# Patient Record
Sex: MALE | Race: WHITE | NOT HISPANIC OR LATINO
[De-identification: names, ages, dates, MRNs, and addresses within clinical notes are randomized per-mention and may not be internally consistent; named-entity substitution may affect disease eponyms.]

---

## 2023-01-10 PROBLEM — Z00.00 ENCOUNTER FOR PREVENTIVE HEALTH EXAMINATION: Status: ACTIVE | Noted: 2023-01-10

## 2023-01-11 ENCOUNTER — APPOINTMENT (OUTPATIENT)
Dept: CARDIOLOGY | Facility: CLINIC | Age: 38
End: 2023-01-11
Payer: COMMERCIAL

## 2023-01-11 ENCOUNTER — NON-APPOINTMENT (OUTPATIENT)
Age: 38
End: 2023-01-11

## 2023-01-11 VITALS — DIASTOLIC BLOOD PRESSURE: 70 MMHG | SYSTOLIC BLOOD PRESSURE: 116 MMHG

## 2023-01-11 VITALS
HEIGHT: 69 IN | DIASTOLIC BLOOD PRESSURE: 66 MMHG | OXYGEN SATURATION: 98 % | TEMPERATURE: 96.6 F | SYSTOLIC BLOOD PRESSURE: 110 MMHG | WEIGHT: 189 LBS | HEART RATE: 92 BPM | BODY MASS INDEX: 27.99 KG/M2

## 2023-01-11 DIAGNOSIS — Z86.39 PERSONAL HISTORY OF OTHER ENDOCRINE, NUTRITIONAL AND METABOLIC DISEASE: ICD-10-CM

## 2023-01-11 DIAGNOSIS — Z78.9 OTHER SPECIFIED HEALTH STATUS: ICD-10-CM

## 2023-01-11 DIAGNOSIS — Z80.0 FAMILY HISTORY OF MALIGNANT NEOPLASM OF DIGESTIVE ORGANS: ICD-10-CM

## 2023-01-11 DIAGNOSIS — Z82.49 FAMILY HISTORY OF ISCHEMIC HEART DISEASE AND OTHER DISEASES OF THE CIRCULATORY SYSTEM: ICD-10-CM

## 2023-01-11 DIAGNOSIS — Z80.8 FAMILY HISTORY OF MALIGNANT NEOPLASM OF OTHER ORGANS OR SYSTEMS: ICD-10-CM

## 2023-01-11 PROCEDURE — 93000 ELECTROCARDIOGRAM COMPLETE: CPT

## 2023-01-11 PROCEDURE — 93242 EXT ECG>48HR<7D RECORDING: CPT | Mod: 59

## 2023-01-11 PROCEDURE — 99205 OFFICE O/P NEW HI 60 MIN: CPT | Mod: 25

## 2023-01-11 RX ORDER — ELECTROLYTES/DEXTROSE
SOLUTION, ORAL ORAL
Refills: 0 | Status: ACTIVE | COMMUNITY

## 2023-01-11 RX ORDER — VORTIOXETINE 20 MG/1
20 TABLET, FILM COATED ORAL DAILY
Refills: 0 | Status: ACTIVE | COMMUNITY

## 2023-01-11 RX ORDER — ALPRAZOLAM 0.25 MG/1
0.25 TABLET ORAL
Refills: 0 | Status: ACTIVE | COMMUNITY

## 2023-01-11 RX ORDER — MULTIVITAMIN
TABLET ORAL
Refills: 0 | Status: ACTIVE | COMMUNITY

## 2023-01-11 NOTE — DISCUSSION/SUMMARY
[FreeTextEntry1] : \par # Palpitations: No alarm symptoms, most likely PAC versus PVC isolated.\par -Echo to rule out structural CV normality\par - ETT for ischemia, heart rate response, BP response, arrhythmia, functional status, oxygen\par -3-day Zio patch\par -Serial lab work\par -Obesity with elevated STOP-BANG score ordered home sleep study\par \par # Hypertriglyceridemia: Reports prior triglyceride 600.  No recent lab work.\par -Updated lab work ordered.  Management depending on results.  Reports no significant thyroid or alcohol use as secondary etiology.\par \par \par Follow-up for testing.  ER precaution given to patient.\par

## 2023-01-11 NOTE — HISTORY OF PRESENT ILLNESS
[FreeTextEntry1] : \par 37 year old\par Palpitations\par Hypertriglyceridemia\par \par \par 1/2023 INITIAL VISIT: for 20 years or so has occasional "skipped beat." feels more prominent when fatigued or after caffeine. minimized caffeine intake. no functional status limitation.  He reports prior having triglycerides 600.  No recent lab work.\par \par \par TESTING I REVIEWED TODAY:

## 2023-01-20 ENCOUNTER — APPOINTMENT (OUTPATIENT)
Dept: CARDIOLOGY | Facility: CLINIC | Age: 38
End: 2023-01-20
Payer: COMMERCIAL

## 2023-01-20 DIAGNOSIS — R07.89 OTHER CHEST PAIN: ICD-10-CM

## 2023-01-20 PROCEDURE — 93015 CV STRESS TEST SUPVJ I&R: CPT

## 2023-01-23 ENCOUNTER — APPOINTMENT (OUTPATIENT)
Dept: CARDIOLOGY | Facility: CLINIC | Age: 38
End: 2023-01-23
Payer: COMMERCIAL

## 2023-01-23 PROCEDURE — 93244 EXT ECG>48HR<7D REV&INTERPJ: CPT

## 2023-02-07 ENCOUNTER — OUTPATIENT (OUTPATIENT)
Dept: OUTPATIENT SERVICES | Facility: HOSPITAL | Age: 38
LOS: 1 days | End: 2023-02-07
Payer: COMMERCIAL

## 2023-02-07 DIAGNOSIS — G47.33 OBSTRUCTIVE SLEEP APNEA (ADULT) (PEDIATRIC): ICD-10-CM

## 2023-02-07 PROCEDURE — 95800 SLP STDY UNATTENDED: CPT

## 2023-02-14 ENCOUNTER — APPOINTMENT (OUTPATIENT)
Dept: CARDIOLOGY | Facility: CLINIC | Age: 38
End: 2023-02-14
Payer: COMMERCIAL

## 2023-02-14 PROCEDURE — 93306 TTE W/DOPPLER COMPLETE: CPT

## 2023-02-21 ENCOUNTER — NON-APPOINTMENT (OUTPATIENT)
Age: 38
End: 2023-02-21

## 2023-02-22 ENCOUNTER — APPOINTMENT (OUTPATIENT)
Dept: CARDIOLOGY | Facility: CLINIC | Age: 38
End: 2023-02-22
Payer: COMMERCIAL

## 2023-02-22 PROCEDURE — 93351 STRESS TTE COMPLETE: CPT

## 2023-02-22 PROCEDURE — 93320 DOPPLER ECHO COMPLETE: CPT

## 2023-02-28 ENCOUNTER — APPOINTMENT (OUTPATIENT)
Dept: PULMONOLOGY | Facility: CLINIC | Age: 38
End: 2023-02-28

## 2023-03-10 ENCOUNTER — APPOINTMENT (OUTPATIENT)
Dept: CARDIOLOGY | Facility: CLINIC | Age: 38
End: 2023-03-10
Payer: COMMERCIAL

## 2023-03-10 VITALS
TEMPERATURE: 97.1 F | HEART RATE: 88 BPM | WEIGHT: 187 LBS | DIASTOLIC BLOOD PRESSURE: 74 MMHG | HEIGHT: 69 IN | BODY MASS INDEX: 27.7 KG/M2 | OXYGEN SATURATION: 97 % | SYSTOLIC BLOOD PRESSURE: 114 MMHG

## 2023-03-10 DIAGNOSIS — R94.39 ABNORMAL RESULT OF OTHER CARDIOVASCULAR FUNCTION STUDY: ICD-10-CM

## 2023-03-10 PROCEDURE — 99215 OFFICE O/P EST HI 40 MIN: CPT

## 2023-03-10 NOTE — DISCUSSION/SUMMARY
[FreeTextEntry1] : \par # Severe LORENE: rec pulm sleep md.  diet/weight loss/lifestyle optimization. Mediterranean diet.\par \par # Hypertriglyceridemia: lorene could be contributory otherwise no significant secondary etiology noted (thyroid or alcohol)\par - atorva 40, labwork and consider increase or fenofibrate\par \par # Mild transaminitis likely hepatic steatosis: serial ordered \par \par # Subclinical hypothyroidism: recheck ordered \par \par # Palpitations: Severe sleep apnea likely contributory. no structural CV abnormality.  No ischemia.  Normal electrolytes.  No hyperthyroidism.  Zio patch unremarkable 3-day..\par \par \par Follow-up in 6 months. call with lab results. ER precaution given to patient.\par

## 2023-03-10 NOTE — CARDIOLOGY SUMMARY
[de-identified] : NSR, fusion beat  [de-identified] : 1/2023: 3-day Zio patch rare ectopy.  Symptoms correlated without arrhythmias.  Average 91. [de-identified] : 2/2023: Treadmill stress echo with 10.3 METS.  Nonspecific EKG changes with normal echocardiographic portion on treadmill.  Normal blood pressure and heart rate responses.  No ischemia. [de-identified] : 2/2023: Preserved biventricular function.  Normal diastolic function.  Trace to mild valvular disease. [de-identified] : \par 1/2023 labwork: Triglycerides 552.  Increased lipoprotein a.  Normal A1c.  Hepatic steatosis likely.  Subclinical hypothyroidism.\par 2/2023: Sleep study showed severe sleep apnea.

## 2023-03-10 NOTE — HISTORY OF PRESENT ILLNESS
[FreeTextEntry1] : \par 37 year old\par Severe LORENE\par Hypertriglyceridemia\par Mild transaminitis likely hepatic steatosis\par Subclinical hypothyroidism\par \par \par 3/2023 VISIT: testing as detailed. started atorvastatin given triglycerides 552.  No ischemia.  Lab work as detailed below.  Severe sleep apnea. +fatigue. no rhythm disturbance. \par \par 1/2023 INITIAL VISIT: for 20 years or so has occasional "skipped beat." feels more prominent when fatigued or after caffeine. minimized caffeine intake. no functional status limitation.  He reports prior having triglycerides 600.  No recent lab work.\par \par \par TESTING I REVIEWED TODAY:

## 2023-04-20 ENCOUNTER — APPOINTMENT (OUTPATIENT)
Dept: PULMONOLOGY | Facility: CLINIC | Age: 38
End: 2023-04-20
Payer: COMMERCIAL

## 2023-04-20 VITALS
BODY MASS INDEX: 27.4 KG/M2 | DIASTOLIC BLOOD PRESSURE: 70 MMHG | WEIGHT: 185 LBS | SYSTOLIC BLOOD PRESSURE: 130 MMHG | HEIGHT: 69 IN

## 2023-04-20 VITALS — HEART RATE: 77 BPM | OXYGEN SATURATION: 97 % | RESPIRATION RATE: 16 BRPM

## 2023-04-20 DIAGNOSIS — Z72.0 TOBACCO USE: ICD-10-CM

## 2023-04-20 PROCEDURE — 99204 OFFICE O/P NEW MOD 45 MIN: CPT

## 2023-04-20 NOTE — HISTORY OF PRESENT ILLNESS
[Obstructive Sleep Apnea] : obstructive sleep apnea [Awakes Unrefreshed] : awakes unrefreshed [Awakes with Dry Mouth] : awakes with dry mouth [Awakes with Headache] : awakes with headache [Daytime Somnolence] : daytime somnolence [Snoring] : snoring [Witnessed Apneas] : witnessed apneas [Home] : home [TextBox_81] : 30 [TextBox_85] : 10 [TextBox_89] : 0 [TextBox_100] : 2/7/2023 [TextBox_108] : 52.4 [TextBox_112] : 4.6 minutes [TextBox_116] : 86 [ESS] : 17

## 2023-04-20 NOTE — DISCUSSION/SUMMARY
[Obstructive Sleep Apnea] : obstructive sleep apnea [Severe] : severe [Alcohol Avoidance] : alcohol avoidance [None] : There are no changes in medication management [Sedative Avoidance] : sedative avoidance [Weight Loss Program] : weight loss program [CPAP] : CPAP [de-identified] : The pathophysiology of sleep was explained to the patient in detail. Inclusive of this was the reasoning behind and the expected response to positive airway pressure therapy. Compliance was outlined including further followup  Patient compliant with CPAP/BiPAP and benefiting from therapy.  Resmed Airsense  autoset  in a range 4-16 with nasal mask

## 2023-04-20 NOTE — END OF VISIT
[FreeTextEntry3] : CPAP initiation [Time Spent: ___ minutes] : I have spent [unfilled] minutes of time on the encounter.

## 2023-04-20 NOTE — CONSULT LETTER
[Dear  ___] : Dear  [unfilled], [Consult Letter:] : I had the pleasure of evaluating your patient, [unfilled]. [Consult Closing:] : Thank you very much for allowing me to participate in the care of this patient.  If you have any questions, please do not hesitate to contact me. [Sincerely,] : Sincerely, [FreeTextEntry3] : Silver Rod MD FCCP\par Pulmonary/Critical Care/Sleep Medicine\par Department of Internal Medicine\par \par [facility]\par

## 2023-07-18 ENCOUNTER — APPOINTMENT (OUTPATIENT)
Dept: PULMONOLOGY | Facility: CLINIC | Age: 38
End: 2023-07-18
Payer: COMMERCIAL

## 2023-07-18 VITALS
DIASTOLIC BLOOD PRESSURE: 70 MMHG | OXYGEN SATURATION: 96 % | RESPIRATION RATE: 16 BRPM | BODY MASS INDEX: 26.66 KG/M2 | WEIGHT: 180 LBS | HEART RATE: 86 BPM | HEIGHT: 69 IN | SYSTOLIC BLOOD PRESSURE: 110 MMHG

## 2023-07-18 PROCEDURE — 99215 OFFICE O/P EST HI 40 MIN: CPT

## 2023-07-18 NOTE — DISCUSSION/SUMMARY
[Obstructive Sleep Apnea] : obstructive sleep apnea [Severe] : severe [None] : There are no changes in medication management [Alcohol Avoidance] : alcohol avoidance [Sedative Avoidance] : sedative avoidance [Weight Loss Program] : weight loss program [CPAP] : CPAP [FreeTextEntry1] : Sample Dreamware FFM medium with small bridge was fitted by my to the patient.\par He may wish to change masks

## 2023-07-18 NOTE — CONSULT LETTER
[Dear  ___] : Dear  [unfilled], [Consult Letter:] : I had the pleasure of evaluating your patient, [unfilled]. [Consult Closing:] : Thank you very much for allowing me to participate in the care of this patient.  If you have any questions, please do not hesitate to contact me. [Sincerely,] : Sincerely, [FreeTextEntry3] : Silver Rod MD FCCP\par Pulmonary/Critical Care/Sleep Medicine\par Department of Internal Medicine\par \par [facility]\par

## 2023-07-18 NOTE — HISTORY OF PRESENT ILLNESS
[Obstructive Sleep Apnea] : obstructive sleep apnea [Home] : home [Nasal mask] : nasal mask [APAP:] : APAP [Awakes Unrefreshed] : does not awaken unrefreshed [Awakes with Dry Mouth] : does not awaken with dry mouth [Awakes with Headache] : does not awaken with headache [Daytime Somnolence] : denies daytime somnolence [Snoring] : no snoring [Witnessed Apneas] : no witnessed apneas [TextBox_81] : 30 [TextBox_85] : 10 [TextBox_89] : 0 [TextBox_100] : 2/7/2023 [TextBox_108] : 52.4 [TextBox_112] : 4.6 minutes [TextBox_116] : 86 [TextBox_125] : 4-16 [TextBox_127] : 6/14/2023 [TextBox_129] : 07/13/2023 [TextBox_133] : 100 [TextBox_137] : 77 [TextBox_141] : 5 [TextBox_143] : 44 [TextBox_147] : 2.9 [TextBox_158] : Tenzin [TextBox_160] : N20 [TextBox_162] : 4/20/2023 [ESS] : 10

## 2023-09-22 ENCOUNTER — APPOINTMENT (OUTPATIENT)
Dept: CARDIOLOGY | Facility: CLINIC | Age: 38
End: 2023-09-22

## 2023-10-11 ENCOUNTER — APPOINTMENT (OUTPATIENT)
Dept: CARDIOLOGY | Facility: CLINIC | Age: 38
End: 2023-10-11
Payer: COMMERCIAL

## 2023-10-11 ENCOUNTER — NON-APPOINTMENT (OUTPATIENT)
Age: 38
End: 2023-10-11

## 2023-10-11 VITALS
WEIGHT: 197 LBS | SYSTOLIC BLOOD PRESSURE: 110 MMHG | DIASTOLIC BLOOD PRESSURE: 66 MMHG | OXYGEN SATURATION: 97 % | HEIGHT: 69 IN | BODY MASS INDEX: 29.18 KG/M2 | HEART RATE: 79 BPM

## 2023-10-11 DIAGNOSIS — Z71.89 OTHER SPECIFIED COUNSELING: ICD-10-CM

## 2023-10-11 PROCEDURE — 93000 ELECTROCARDIOGRAM COMPLETE: CPT

## 2023-10-23 ENCOUNTER — APPOINTMENT (OUTPATIENT)
Dept: CARDIOLOGY | Facility: CLINIC | Age: 38
End: 2023-10-23
Payer: COMMERCIAL

## 2023-10-23 PROCEDURE — 93242 EXT ECG>48HR<7D RECORDING: CPT

## 2023-10-25 ENCOUNTER — NON-APPOINTMENT (OUTPATIENT)
Age: 38
End: 2023-10-25

## 2023-10-31 ENCOUNTER — NON-APPOINTMENT (OUTPATIENT)
Age: 38
End: 2023-10-31

## 2023-11-14 ENCOUNTER — NON-APPOINTMENT (OUTPATIENT)
Age: 38
End: 2023-11-14

## 2023-11-15 ENCOUNTER — APPOINTMENT (OUTPATIENT)
Dept: CARDIOLOGY | Facility: CLINIC | Age: 38
End: 2023-11-15
Payer: COMMERCIAL

## 2023-11-15 VITALS
DIASTOLIC BLOOD PRESSURE: 60 MMHG | BODY MASS INDEX: 29.62 KG/M2 | SYSTOLIC BLOOD PRESSURE: 108 MMHG | OXYGEN SATURATION: 98 % | WEIGHT: 200 LBS | HEART RATE: 87 BPM | HEIGHT: 69 IN

## 2023-11-15 DIAGNOSIS — R00.2 PALPITATIONS: ICD-10-CM

## 2023-11-15 PROCEDURE — 99215 OFFICE O/P EST HI 40 MIN: CPT

## 2023-11-15 RX ORDER — ATORVASTATIN CALCIUM 40 MG/1
40 TABLET, FILM COATED ORAL
Qty: 90 | Refills: 3 | Status: DISCONTINUED | COMMUNITY
Start: 2023-01-23 | End: 2023-11-15

## 2023-11-24 ENCOUNTER — APPOINTMENT (OUTPATIENT)
Dept: CARDIOLOGY | Facility: CLINIC | Age: 38
End: 2023-11-24
Payer: COMMERCIAL

## 2023-11-24 VITALS
BODY MASS INDEX: 30.51 KG/M2 | HEART RATE: 79 BPM | SYSTOLIC BLOOD PRESSURE: 112 MMHG | DIASTOLIC BLOOD PRESSURE: 62 MMHG | OXYGEN SATURATION: 96 % | WEIGHT: 206 LBS | HEIGHT: 69 IN

## 2023-11-24 PROCEDURE — 99213 OFFICE O/P EST LOW 20 MIN: CPT | Mod: 25

## 2023-11-24 PROCEDURE — 93000 ELECTROCARDIOGRAM COMPLETE: CPT

## 2023-11-29 ENCOUNTER — APPOINTMENT (OUTPATIENT)
Dept: ELECTROPHYSIOLOGY | Facility: CLINIC | Age: 38
End: 2023-11-29
Payer: COMMERCIAL

## 2023-11-29 VITALS
DIASTOLIC BLOOD PRESSURE: 62 MMHG | BODY MASS INDEX: 29.18 KG/M2 | HEIGHT: 69 IN | HEART RATE: 77 BPM | OXYGEN SATURATION: 95 % | WEIGHT: 197 LBS | SYSTOLIC BLOOD PRESSURE: 110 MMHG

## 2023-11-29 PROCEDURE — 93000 ELECTROCARDIOGRAM COMPLETE: CPT

## 2023-11-29 PROCEDURE — 99204 OFFICE O/P NEW MOD 45 MIN: CPT | Mod: 25

## 2023-11-30 ENCOUNTER — NON-APPOINTMENT (OUTPATIENT)
Age: 38
End: 2023-11-30

## 2024-01-17 RX ORDER — ASPIRIN 81 MG/1
81 TABLET ORAL DAILY
Qty: 90 | Refills: 3 | Status: ACTIVE | COMMUNITY
Start: 2024-01-17 | End: 1900-01-01

## 2024-01-17 RX ORDER — APIXABAN 5 MG/1
5 TABLET, FILM COATED ORAL
Qty: 180 | Refills: 1 | Status: DISCONTINUED | COMMUNITY
Start: 2023-10-24 | End: 2024-01-17

## 2024-02-13 PROBLEM — R74.01 TRANSAMINITIS: Status: ACTIVE | Noted: 2023-03-10

## 2024-02-13 PROBLEM — Z99.89 CPAP (CONTINUOUS POSITIVE AIRWAY PRESSURE) DEPENDENCE: Status: ACTIVE | Noted: 2023-07-18

## 2024-02-13 PROBLEM — G47.33 SEVERE OBSTRUCTIVE SLEEP APNEA: Status: ACTIVE | Noted: 2023-03-10

## 2024-02-13 PROBLEM — E03.8 SUBCLINICAL HYPOTHYROIDISM: Status: ACTIVE | Noted: 2023-03-10

## 2024-02-13 PROBLEM — E78.41 ELEVATED LIPOPROTEIN A LEVEL: Status: ACTIVE | Noted: 2023-01-20

## 2024-02-13 PROBLEM — E78.1 HYPERTRIGLYCERIDEMIA: Status: ACTIVE | Noted: 2023-01-11

## 2024-02-14 ENCOUNTER — APPOINTMENT (OUTPATIENT)
Dept: CARDIOLOGY | Facility: CLINIC | Age: 39
End: 2024-02-14
Payer: COMMERCIAL

## 2024-02-14 VITALS
WEIGHT: 200 LBS | BODY MASS INDEX: 29.62 KG/M2 | SYSTOLIC BLOOD PRESSURE: 114 MMHG | HEART RATE: 85 BPM | OXYGEN SATURATION: 94 % | HEIGHT: 69 IN | DIASTOLIC BLOOD PRESSURE: 66 MMHG

## 2024-02-14 DIAGNOSIS — E78.1 PURE HYPERGLYCERIDEMIA: ICD-10-CM

## 2024-02-14 DIAGNOSIS — E78.41 ELEVATED LIPOPROTEIN(A): ICD-10-CM

## 2024-02-14 DIAGNOSIS — G47.33 OBSTRUCTIVE SLEEP APNEA (ADULT) (PEDIATRIC): ICD-10-CM

## 2024-02-14 DIAGNOSIS — E03.8 OTHER SPECIFIED HYPOTHYROIDISM: ICD-10-CM

## 2024-02-14 DIAGNOSIS — Z99.89 DEPENDENCE ON OTHER ENABLING MACHINES AND DEVICES: ICD-10-CM

## 2024-02-14 DIAGNOSIS — R74.01 ELEVATION OF LEVELS OF LIVER TRANSAMINASE LEVELS: ICD-10-CM

## 2024-02-14 LAB — HBA1C MFR BLD HPLC: 5.8

## 2024-02-14 PROCEDURE — 99214 OFFICE O/P EST MOD 30 MIN: CPT

## 2024-02-14 NOTE — ADDENDUM
[FreeTextEntry1] : Please note the patient was reviewed with the PA. I was physically present during the service of the patient I was directly involved in the management plan and recommendations of care provided to the patient.  I personally reviewed the history and physical exam and plan as documented by the PA above.  Jose Alfredo Jean DO, FAC, [facility] Cardiologist 11/24/2023

## 2024-02-14 NOTE — CARDIOLOGY SUMMARY
[de-identified] : NSR, fusion beat  [de-identified] : 10/2023 afibb noted 1/2023: 3-day Zio patch rare ectopy.  Symptoms correlated without arrhythmias.  Average 91. [de-identified] : 2/2023: Treadmill stress echo with 10.3 METS.  Nonspecific EKG changes with normal echocardiographic portion on treadmill.  Normal blood pressure and heart rate responses.  No ischemia. [de-identified] : 2/2023: Preserved biventricular function.  Normal diastolic function.  Trace to mild valvular disease. [de-identified] : \par  1/2023 labwork: Triglycerides 552.  Increased lipoprotein a.  Normal A1c.  Hepatic steatosis likely.  Subclinical hypothyroidism.\par  2/2023: Sleep study showed severe sleep apnea.

## 2024-02-14 NOTE — DISCUSSION/SUMMARY
[FreeTextEntry1] : # Paroxysmal atrial fibrillation: CHADSVASC 0. Severe sleep apnea likely contributory.  Utilizes CPAP on regular basis.  No structural CV abnormality.  No ischemia.  Normal electrolytes.  No hyperthyroidism. - sees EP, maintain aspirin 81 therapy. will consider AFibb burden to decide on any further therapy.   # Severe LORENE: continue pulm/sleep and CPAP f/u. diet/weight loss/lifestyle optimization. Mediterranean diet.  # Hypertriglyceridemia: lorene could be contributory otherwise no significant secondary etiology noted (thyroid or alcohol) - needs recheck after he has been on rosuvastatin 40 and fenofibrate now (prior was confused).   # Mild transaminitis likely hepatic steatosis: stable.   Follow-up with EP as scheduled. See me in 5 months with labwork prior. ER precautions given to patient.

## 2024-02-14 NOTE — REASON FOR VISIT
[Symptom and Test Evaluation] : symptom and test evaluation [CV Risk Factors and Non-Cardiac Disease] : CV risk factors and non-cardiac disease [FreeTextEntry3] : Dr. Mo Summers

## 2024-02-23 ENCOUNTER — APPOINTMENT (OUTPATIENT)
Dept: ELECTROPHYSIOLOGY | Facility: CLINIC | Age: 39
End: 2024-02-23
Payer: COMMERCIAL

## 2024-02-23 VITALS
OXYGEN SATURATION: 98 % | DIASTOLIC BLOOD PRESSURE: 60 MMHG | WEIGHT: 200 LBS | HEART RATE: 73 BPM | BODY MASS INDEX: 29.62 KG/M2 | HEIGHT: 69 IN | SYSTOLIC BLOOD PRESSURE: 98 MMHG

## 2024-02-23 DIAGNOSIS — I48.91 UNSPECIFIED ATRIAL FIBRILLATION: ICD-10-CM

## 2024-02-23 PROCEDURE — 93000 ELECTROCARDIOGRAM COMPLETE: CPT

## 2024-02-23 PROCEDURE — 99214 OFFICE O/P EST MOD 30 MIN: CPT | Mod: 25

## 2024-02-23 RX ORDER — FLECAINIDE ACETATE 50 MG/1
50 TABLET ORAL
Qty: 60 | Refills: 1 | Status: ACTIVE | COMMUNITY
Start: 2024-02-23 | End: 1900-01-01

## 2024-03-01 PROBLEM — I48.91 ATRIAL FIBRILLATION, UNSPECIFIED TYPE: Status: ACTIVE | Noted: 2023-10-24

## 2024-03-01 NOTE — DISCUSSION/SUMMARY
[EKG obtained to assist in diagnosis and management of assessed problem(s)] : EKG obtained to assist in diagnosis and management of assessed problem(s) [FreeTextEntry1] : I have discussed the options with the patient of medication versus catheter ablation procedure.  He would prefer to try medication first.  He had a previous stress echo performed February 2023.  This was normal.    The options for medications patient would be a type Ic drug versus sotalol. He has no family history of coronary disease.  Will try him on an antiarrhythmic.  He is already on metoprolol succinate ER 25 mg twice daily.  Will start him on for flecainide 50 mg twice daily.  Will get monitoring as well. Side effect profile discussed.  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  He was noted on his monitor from October 2023.  Patient seems to be aware of his A-fib episodes with palpitations and sometimes shortness of breath.  There are no clear triggers although 1 episode was triggered by excessive coughing.  He does have a history of some severe sleep apnea.  There is a questionable family history of A-fib but is not having A-fib His alcohol consumption is insignificant.  His caffeine intake is also minimal.  Patient has a prior history of ulcerative anxiety and depression for which she takes Xanax as needed also takes Trintellix.  I discussed the pathophysiology of his atrial fibrillation with treatment plans, lifestyle modifications should be.  The patient and his spouse seems to understand.  Our plan will be to aggressively take care of his sleep apnea weight loss and stress reduction.  Will continue on the current dose of metoprolol.  He is on Eliquis 5 mg twice daily and because of his low [id number] score he would not need to continue this.  I would reassess the patient over 3 to 4 months.  To see what his AF burden is and then make further decisions whether we would use an antiarrhythmic or proceed to catheter ablation procedure.   d/c eliquis can take ASA 81 mg f/u 4 months to reassess

## 2024-03-01 NOTE — REVIEW OF SYSTEMS
[Palpitations] : palpitations [Under Stress] : under stress [Fever] : no fever [Feeling Fatigued] : not feeling fatigued [Sore Throat] : no sore throat [Dyspnea on exertion] : not dyspnea during exertion [SOB] : no shortness of breath [Abdominal Pain] : no abdominal pain [Cough] : no cough [Easy Bleeding] : no tendency for easy bleeding [Dizziness] : no dizziness

## 2024-03-01 NOTE — HISTORY OF PRESENT ILLNESS
[FreeTextEntry1] : He was seen in follow-up evaluation regarding his atrial fibrillation.  Apparently he is getting more episodes recently.  His main risk factor was the sleep apnea and is compliant with his CPAP.    A-fib frequency: Episodes have been random and variable in duration.  They can last a few minutes to much longer as much hour.    His symptoms seem to increased January of this year which time he seen a cardiologist.  Known to have APCs and PVCs.  Patient was doing well until August 2023 when he had an episode of atrial fibrillation.  He has not had any recurrence of A-fib since then.  He has symptoms of palpitations.  The patient had a Zio patch monitor October 2023 which showed episodes of A-fib with increased ventricular response.  Also showed short episodes of sinus tachycardia and APCs.  The episodes of A-fib noted on the monitor patient thinks he was at work.  He had a stress echocardiogram performed February 22, 2023 patient exercised for 9 minutes standard Wally protocol stopped because of target heart rate achieved peak heart rate was 172 bpm 10.3 METS.  Normal stress echo  Echocardiogram performed February 4, 2023 showed EF 60 to 65%, trace mitral regurg, trace tricuspid regurg, no pericardial effusion.  Normal right and left atrial size.  He had a sleep study performed February 7, 2023 that showed severe sleep apnea with an AHI of 52.4 and khris O2 saturation of 86%.  He currently uses CPAP but not recently because of stuffy nose/sinus issue.  He has no known history of hypertension or diabetes.  The patient has been on Eliquis 5 mg twice daily.  Is also been on metoprolol succinate ER 25 mg 1 tablet twice daily.  This is in addition to his lipid therapy.  Current symptoms: Patient denies shortness of breath., Palpitations.  Occasional atypical sharp chest discomfort.  He does get shortness of breath when he is in A-fib. AF trigger: cough No thyroid problems.  Has GE reflux

## 2024-03-26 RX ORDER — FENOFIBRATE 145 MG/1
145 TABLET, COATED ORAL DAILY
Qty: 90 | Refills: 1 | Status: ACTIVE | COMMUNITY
Start: 2023-11-15 | End: 1900-01-01

## 2024-04-04 ENCOUNTER — RESULT REVIEW (OUTPATIENT)
Age: 39
End: 2024-04-04

## 2024-04-04 ENCOUNTER — APPOINTMENT (OUTPATIENT)
Dept: ULTRASOUND IMAGING | Facility: CLINIC | Age: 39
End: 2024-04-04
Payer: COMMERCIAL

## 2024-04-04 PROCEDURE — 76981 USE PARENCHYMA: CPT

## 2024-05-17 ENCOUNTER — NON-APPOINTMENT (OUTPATIENT)
Age: 39
End: 2024-05-17

## 2024-05-18 ENCOUNTER — RX RENEWAL (OUTPATIENT)
Age: 39
End: 2024-05-18

## 2024-05-18 RX ORDER — ROSUVASTATIN CALCIUM 40 MG/1
40 TABLET, FILM COATED ORAL DAILY
Qty: 90 | Refills: 3 | Status: ACTIVE | COMMUNITY
Start: 2023-11-15 | End: 1900-01-01

## 2024-06-18 ENCOUNTER — NON-APPOINTMENT (OUTPATIENT)
Age: 39
End: 2024-06-18

## 2024-06-19 RX ORDER — METOPROLOL SUCCINATE 25 MG/1
25 TABLET, EXTENDED RELEASE ORAL
Qty: 180 | Refills: 3 | Status: ACTIVE | COMMUNITY
Start: 2023-10-24 | End: 1900-01-01

## 2024-07-24 ENCOUNTER — NON-APPOINTMENT (OUTPATIENT)
Age: 39
End: 2024-07-24

## 2024-07-24 ENCOUNTER — APPOINTMENT (OUTPATIENT)
Dept: CARDIOLOGY | Facility: CLINIC | Age: 39
End: 2024-07-24
Payer: COMMERCIAL

## 2024-07-24 VITALS
BODY MASS INDEX: 28.94 KG/M2 | OXYGEN SATURATION: 96 % | DIASTOLIC BLOOD PRESSURE: 60 MMHG | WEIGHT: 196 LBS | SYSTOLIC BLOOD PRESSURE: 90 MMHG | HEART RATE: 70 BPM

## 2024-07-24 DIAGNOSIS — G47.33 OBSTRUCTIVE SLEEP APNEA (ADULT) (PEDIATRIC): ICD-10-CM

## 2024-07-24 DIAGNOSIS — E78.1 PURE HYPERGLYCERIDEMIA: ICD-10-CM

## 2024-07-24 DIAGNOSIS — E78.41 ELEVATED LIPOPROTEIN(A): ICD-10-CM

## 2024-07-24 DIAGNOSIS — R74.01 ELEVATION OF LEVELS OF LIVER TRANSAMINASE LEVELS: ICD-10-CM

## 2024-07-24 DIAGNOSIS — I48.91 UNSPECIFIED ATRIAL FIBRILLATION: ICD-10-CM

## 2024-07-24 PROCEDURE — 99214 OFFICE O/P EST MOD 30 MIN: CPT

## 2024-07-24 PROCEDURE — 93000 ELECTROCARDIOGRAM COMPLETE: CPT

## 2024-07-24 RX ORDER — BUPROPION HYDROCHLORIDE 300 MG/1
300 TABLET, EXTENDED RELEASE ORAL DAILY
Refills: 0 | Status: ACTIVE | COMMUNITY

## 2024-07-24 NOTE — ADDENDUM
[FreeTextEntry1] : Please note the patient was reviewed with the PA. I was physically present during the service of the patient I was directly involved in the management plan and recommendations of care provided to the patient.  I personally reviewed the history and physical exam and plan as documented by the PA above.  Jose Alfredo Jean DO, FAC, [facility] Cardiologist 11/24/2023

## 2024-07-24 NOTE — DISCUSSION/SUMMARY
[FreeTextEntry1] : # symptomatic paroxysmal atrial fibrillation: CHADSVASC 0. Severe sleep apnea likely contributory.  Utilizes CPAP on regular basis.  No structural CV abnormality.  No ischemia.  Normal electrolytes.  No hyperthyroidism. - sees EP, maintain aspirin 81 therapy. flecainide/bb, he will discuss with EP regarding doses. tremor ?from flecainide.   # Severe LORENE: continue pulm/sleep and CPAP f/u. diet/weight loss/lifestyle optimization. Mediterranean diet.  # Hypertriglyceridemia: lorene could be contributory otherwise no significant secondary etiology noted (thyroid or alcohol) - needs recheck after he has been on rosuvastatin 40 and fenofibrate.   getting labwork next month with PCP, send to me. Follow-up with EP soon to discuss meds. See me in 6 months. ER precautions given to patient.

## 2024-07-24 NOTE — CARDIOLOGY SUMMARY
[de-identified] : NSR, fusion beat  [de-identified] : 10/2023 afibb noted 1/2023: 3-day Zio patch rare ectopy.  Symptoms correlated without arrhythmias.  Average 91. [de-identified] : 2/2023: Treadmill stress echo with 10.3 METS.  Nonspecific EKG changes with normal echocardiographic portion on treadmill.  Normal blood pressure and heart rate responses.  No ischemia. [de-identified] : 2/2023: Preserved biventricular function.  Normal diastolic function.  Trace to mild valvular disease. [de-identified] : \par  1/2023 labwork: Triglycerides 552.  Increased lipoprotein a.  Normal A1c.  Hepatic steatosis likely.  Subclinical hypothyroidism.\par  2/2023: Sleep study showed severe sleep apnea.

## 2024-07-24 NOTE — HISTORY OF PRESENT ILLNESS
[FreeTextEntry1] : 38 year old Paroxysmal atrial fibrillation  Severe LORENE CPAP  Hypertriglyceridemia Mild transaminitis hepatic steatosis  7/2024 VISIT: feels great no recurrence, change meds as per EP. elastography found hepatic steatosis. benign rhythm monitor. ekg good intervals.   2/2024 VISIT: intermittent symptoms of afibb of fatigue. sinus presently. triglycerides elevated was confused about his lipid-lowering therapy. going to the gym.   11/24/23 VISIT: Went back into AF on Tues.  [place] fatigue and BORRERO.  He has increased his metoprolol to 50mg QD.  Feels well.  Denies abnormal bleeding. EKG in the office today with NSR.   11/2023 VISIT: had afibb rvr on rhythm monitoring. started on OAC. no angina.  labwork still with triglycerides 469. a1c 5.8. normal tsh.   10/2023 VISIT: saw pulm sleep on cpap now. did not repeat bloodwork. no angina. still palpitations but benign.   3/2023 VISIT: testing as detailed. started atorvastatin given triglycerides 552.  No ischemia.  Lab work as detailed below.  Severe sleep apnea. +fatigue. no rhythm disturbance.   1/2023 INITIAL VISIT: for 20 years or so has occasional "skipped beat." feels more prominent when fatigued or after caffeine. minimized caffeine intake. no functional status limitation.  He reports prior having triglycerides 600.  No recent lab work.  **TESTING I REVIEWED TODAY: excluding above

## 2024-08-23 ENCOUNTER — APPOINTMENT (OUTPATIENT)
Dept: ELECTROPHYSIOLOGY | Facility: CLINIC | Age: 39
End: 2024-08-23
Payer: COMMERCIAL

## 2024-08-23 ENCOUNTER — NON-APPOINTMENT (OUTPATIENT)
Age: 39
End: 2024-08-23

## 2024-08-23 VITALS
OXYGEN SATURATION: 100 % | WEIGHT: 200 LBS | SYSTOLIC BLOOD PRESSURE: 100 MMHG | HEIGHT: 69 IN | HEART RATE: 72 BPM | DIASTOLIC BLOOD PRESSURE: 62 MMHG | BODY MASS INDEX: 29.62 KG/M2

## 2024-08-23 DIAGNOSIS — I48.91 UNSPECIFIED ATRIAL FIBRILLATION: ICD-10-CM

## 2024-08-23 DIAGNOSIS — G47.33 OBSTRUCTIVE SLEEP APNEA (ADULT) (PEDIATRIC): ICD-10-CM

## 2024-08-23 PROCEDURE — 99214 OFFICE O/P EST MOD 30 MIN: CPT | Mod: 25

## 2024-08-23 PROCEDURE — 93000 ELECTROCARDIOGRAM COMPLETE: CPT

## 2024-08-23 NOTE — DISCUSSION/SUMMARY
[FreeTextEntry1] : I have rediscussed the options with the patient of medication versus catheter ablation procedure.  He wants to continue on medication AAD  volunteer  low [id number] score. Regarding his tremors this could be from the flecainide even though its low dosage.  I would ask him to hold the flecainide for few days to week and see if his tremors resolve.  The patient will call our office in about a week to let us know if his tremors is resolved off the flecainide.  If it resolved well to flecainide then we might consider a different antiarrhythmic for him. f/u 6months to reassess  [EKG obtained to assist in diagnosis and management of assessed problem(s)] : EKG obtained to assist in diagnosis and management of assessed problem(s)

## 2024-08-23 NOTE — HISTORY OF PRESENT ILLNESS
[FreeTextEntry1] : Nikko is a 39-year-old who is seen in follow-up evaluation regarding his atrial fibrillation.  Since last seen by me February 2024 he has not had any recurrence of A-fib.  He has describes some tremors in his hands.  He feels this started after the medications.  He is currently on flecainide 50 mg twice daily as well as metoprolol 25 mg twice daily in addition to his other medications.  His main risk factor was the sleep apnea and is compliant with his CPAP.  Patient works at night.    A-fib frequency: Previously the episodes were random and variable in duration but recently he is not having episodes.    His symptoms seem to increased January of this year which time he seen a cardiologist.  Known to have APCs and PVCs.  Patient was doing well until August 2023 when he had an episode of atrial fibrillation.  He has not had any recurrence of A-fib since then.  He has symptoms of palpitations.  The patient had a Zio patch monitor October 2023 which showed episodes of A-fib with increased ventricular response.  Also showed short episodes of sinus tachycardia and APCs.  The episodes of A-fib noted on the monitor patient thinks he was at work.  He had a stress echocardiogram performed February 22, 2023 patient exercised for 9 minutes standard Wally protocol stopped because of target heart rate achieved peak heart rate was 172 bpm 10.3 METS.  Normal stress echo  Echocardiogram performed February 4, 2023 showed EF 60 to 65%, trace mitral regurg, trace tricuspid regurg, no pericardial effusion.  Normal right and left atrial size.  He had a sleep study performed February 7, 2023 that showed severe sleep apnea with an AHI of 52.4 and khris O2 saturation of 86%.  He currently uses CPAP but not recently because of stuffy nose/sinus issue.  He has no known history of hypertension or diabetes.  The patient has been on Eliquis 5 mg twice daily.  Is also been on metoprolol succinate ER 25 mg 1 tablet twice daily.  This is in addition to his lipid therapy.  Current symptoms: Patient denies shortness of breath., Palpitations.  Occasional atypical sharp chest discomfort.  He does get shortness of breath when he is in A-fib. AF trigger: cough No thyroid problems.  Has GE reflux

## 2024-08-23 NOTE — REVIEW OF SYSTEMS
[Fever] : no fever [Feeling Fatigued] : not feeling fatigued [Sore Throat] : no sore throat [SOB] : no shortness of breath [Dyspnea on exertion] : not dyspnea during exertion [Palpitations] : palpitations [Cough] : no cough [Abdominal Pain] : no abdominal pain [Dizziness] : no dizziness [Under Stress] : under stress [Easy Bleeding] : no tendency for easy bleeding

## 2024-09-28 ENCOUNTER — RX RENEWAL (OUTPATIENT)
Age: 39
End: 2024-09-28

## 2025-02-04 ENCOUNTER — NON-APPOINTMENT (OUTPATIENT)
Age: 40
End: 2025-02-04

## 2025-02-08 ENCOUNTER — RX RENEWAL (OUTPATIENT)
Age: 40
End: 2025-02-08

## 2025-02-08 RX ORDER — ASPIRIN 81 MG/1
81 TABLET, COATED ORAL
Qty: 90 | Refills: 3 | Status: ACTIVE | COMMUNITY
Start: 2025-02-08 | End: 1900-01-01

## 2025-02-21 ENCOUNTER — APPOINTMENT (OUTPATIENT)
Dept: ELECTROPHYSIOLOGY | Facility: CLINIC | Age: 40
End: 2025-02-21

## 2025-03-21 ENCOUNTER — RX RENEWAL (OUTPATIENT)
Age: 40
End: 2025-03-21